# Patient Record
(demographics unavailable — no encounter records)

---

## 2025-07-21 NOTE — HISTORY OF PRESENT ILLNESS
[FreeTextEntry1] : Ms. ALARCON is a 73 year female presenting for evaluation of ***   Notable PMHx: - CAD - Arrhythmias - Valvular disease - HTN - HLD - DM2 - PAD - Stroke - Obesity - JOEL - Smoking - Prior pregnancies   HPI: ***   Functional Status: *** *** Denies chest pain, palpitations, light-headedness/dizziness, syncope, orthopnea, PND, ankle swelling, calf claudication, cough, fever/chills, snoring, apneic episodes       Social Hx: - tobacco use: - alcohol use: - recreational drug use: - occupation: - lives with:   Data Review: Labs - *** EKG - *** Echo - *** Stress - *** Cardiac CT - *** Cardiac MRI - *** Holter - *** Cath - *** Other - ***   ------------------------------ ***   # *** -   RTC in *** or sooner PRN